# Patient Record
Sex: FEMALE | Race: OTHER | HISPANIC OR LATINO
[De-identification: names, ages, dates, MRNs, and addresses within clinical notes are randomized per-mention and may not be internally consistent; named-entity substitution may affect disease eponyms.]

---

## 2017-01-23 ENCOUNTER — APPOINTMENT (OUTPATIENT)
Dept: UROGYNECOLOGY | Facility: CLINIC | Age: 62
End: 2017-01-23

## 2017-01-23 VITALS
WEIGHT: 205 LBS | SYSTOLIC BLOOD PRESSURE: 104 MMHG | HEIGHT: 66 IN | DIASTOLIC BLOOD PRESSURE: 69 MMHG | BODY MASS INDEX: 32.95 KG/M2

## 2017-01-23 DIAGNOSIS — R39.15 URGENCY OF URINATION: ICD-10-CM

## 2017-01-23 DIAGNOSIS — Z98.890 OTHER SPECIFIED POSTPROCEDURAL STATES: ICD-10-CM

## 2017-01-23 LAB
BILIRUB UR QL STRIP: NEGATIVE
CLARITY UR: CLEAR
COLLECTION METHOD: NORMAL
GLUCOSE UR-MCNC: NEGATIVE
HCG UR QL: 0.2 EU/DL
HGB UR QL STRIP.AUTO: NORMAL
KETONES UR-MCNC: NORMAL
LEUKOCYTE ESTERASE UR QL STRIP: NEGATIVE
NITRITE UR QL STRIP: NEGATIVE
PH UR STRIP: 5.5
PROT UR STRIP-MCNC: NEGATIVE
SP GR UR STRIP: 1.01

## 2018-06-15 ENCOUNTER — EMERGENCY (EMERGENCY)
Facility: HOSPITAL | Age: 63
LOS: 1 days | Discharge: HOME | End: 2018-06-15
Attending: EMERGENCY MEDICINE
Payer: COMMERCIAL

## 2018-06-15 VITALS — HEIGHT: 63 IN | WEIGHT: 199.96 LBS

## 2018-06-15 VITALS
DIASTOLIC BLOOD PRESSURE: 72 MMHG | TEMPERATURE: 98 F | HEART RATE: 64 BPM | RESPIRATION RATE: 18 BRPM | OXYGEN SATURATION: 98 % | SYSTOLIC BLOOD PRESSURE: 134 MMHG

## 2018-06-15 DIAGNOSIS — K82.9 DISEASE OF GALLBLADDER, UNSPECIFIED: Chronic | ICD-10-CM

## 2018-06-15 DIAGNOSIS — Z90.710 ACQUIRED ABSENCE OF BOTH CERVIX AND UTERUS: Chronic | ICD-10-CM

## 2018-06-15 PROCEDURE — 73562 X-RAY EXAM OF KNEE 3: CPT | Mod: 26,RT

## 2018-06-15 PROCEDURE — T1013: CPT

## 2018-06-15 PROCEDURE — 99283 EMERGENCY DEPT VISIT LOW MDM: CPT

## 2018-06-15 PROCEDURE — 73562 X-RAY EXAM OF KNEE 3: CPT

## 2018-06-15 RX ORDER — ACETAMINOPHEN 500 MG
975 TABLET ORAL ONCE
Qty: 0 | Refills: 0 | Status: COMPLETED | OUTPATIENT
Start: 2018-06-15 | End: 2018-06-15

## 2018-06-15 RX ADMIN — Medication 975 MILLIGRAM(S): at 08:44

## 2018-06-15 NOTE — ED STATDOCS - PLAN OF CARE
ace wrap, Apply ice to affected area for 15-20 minutes every few hours for the next few days.  Use as much or as frequently as desired. Tylenol extra strength 2 tablets every 4 hours or Ibuprofen 600mg (3 tablets) every 6 hours as needed for aches, pains. Return immediately to the ER for re-evaluation if your symptoms recur or worsening. Otherwise, follow-up with PMD or orthopedics in 1-2 weeks for reassessment: call today to arrange an appointment

## 2018-06-15 NOTE — ED STATDOCS - CARE PLAN
Principal Discharge DX:	Acute pain of right knee  Assessment and plan of treatment:	ace wrap, Apply ice to affected area for 15-20 minutes every few hours for the next few days.  Use as much or as frequently as desired. Tylenol extra strength 2 tablets every 4 hours or Ibuprofen 600mg (3 tablets) every 6 hours as needed for aches, pains. Return immediately to the ER for re-evaluation if your symptoms recur or worsening. Otherwise, follow-up with PMD or orthopedics in 1-2 weeks for reassessment: call today to arrange an appointment

## 2018-06-15 NOTE — ED STATDOCS - MUSCULOSKELETAL, MLM
no deformity, decrease active ROM 0-60 degress, no effusion/ ballotment, no localized bony tendneress, no gross ligamentous deformity

## 2018-06-15 NOTE — ED ADULT NURSE NOTE - OBJECTIVE STATEMENT
Pt was on her way to work this morning and felt something "crack" in her right knee. No swelling or deformity.

## 2018-06-15 NOTE — ED STATDOCS - OBJECTIVE STATEMENT
sudden onset of pain in right knee getting up from chair this morning; felt pop/ pain.  no trauma or injruy. no prior knee problems.  no hip or ankle pain.  hasn't taken any meds.  PMD:  Mark Anthony

## 2018-06-25 ENCOUNTER — APPOINTMENT (OUTPATIENT)
Dept: ORTHOPEDIC SURGERY | Facility: CLINIC | Age: 63
End: 2018-06-25
Payer: COMMERCIAL

## 2018-06-25 VITALS
DIASTOLIC BLOOD PRESSURE: 61 MMHG | HEIGHT: 65 IN | SYSTOLIC BLOOD PRESSURE: 132 MMHG | HEART RATE: 81 BPM | TEMPERATURE: 98 F | WEIGHT: 200 LBS | BODY MASS INDEX: 33.32 KG/M2

## 2018-06-25 PROCEDURE — 20610 DRAIN/INJ JOINT/BURSA W/O US: CPT | Mod: RT

## 2018-06-25 PROCEDURE — 99204 OFFICE O/P NEW MOD 45 MIN: CPT | Mod: 25

## 2018-07-09 ENCOUNTER — FORM ENCOUNTER (OUTPATIENT)
Age: 63
End: 2018-07-09

## 2018-07-10 ENCOUNTER — OUTPATIENT (OUTPATIENT)
Dept: OUTPATIENT SERVICES | Facility: HOSPITAL | Age: 63
LOS: 1 days | End: 2018-07-10
Payer: COMMERCIAL

## 2018-07-10 ENCOUNTER — APPOINTMENT (OUTPATIENT)
Dept: MRI IMAGING | Facility: CLINIC | Age: 63
End: 2018-07-10
Payer: COMMERCIAL

## 2018-07-10 DIAGNOSIS — K82.9 DISEASE OF GALLBLADDER, UNSPECIFIED: Chronic | ICD-10-CM

## 2018-07-10 DIAGNOSIS — M17.11 UNILATERAL PRIMARY OSTEOARTHRITIS, RIGHT KNEE: ICD-10-CM

## 2018-07-10 DIAGNOSIS — Z90.710 ACQUIRED ABSENCE OF BOTH CERVIX AND UTERUS: Chronic | ICD-10-CM

## 2018-07-10 PROCEDURE — 73721 MRI JNT OF LWR EXTRE W/O DYE: CPT | Mod: 26,RT

## 2018-07-10 PROCEDURE — 73721 MRI JNT OF LWR EXTRE W/O DYE: CPT

## 2018-07-13 ENCOUNTER — APPOINTMENT (OUTPATIENT)
Dept: ORTHOPEDIC SURGERY | Facility: CLINIC | Age: 63
End: 2018-07-13
Payer: COMMERCIAL

## 2018-07-13 DIAGNOSIS — M17.11 UNILATERAL PRIMARY OSTEOARTHRITIS, RIGHT KNEE: ICD-10-CM

## 2018-07-13 PROCEDURE — 99214 OFFICE O/P EST MOD 30 MIN: CPT

## 2018-07-13 RX ORDER — DICLOFENAC SODIUM 75 MG/1
75 TABLET, DELAYED RELEASE ORAL
Qty: 28 | Refills: 2 | Status: ACTIVE | COMMUNITY
Start: 2018-07-13 | End: 1900-01-01

## 2019-02-22 ENCOUNTER — TRANSCRIPTION ENCOUNTER (OUTPATIENT)
Age: 64
End: 2019-02-22

## 2023-07-04 ENCOUNTER — EMERGENCY (EMERGENCY)
Facility: HOSPITAL | Age: 68
LOS: 1 days | Discharge: HOME | End: 2023-07-04
Attending: STUDENT IN AN ORGANIZED HEALTH CARE EDUCATION/TRAINING PROGRAM
Payer: MEDICARE

## 2023-07-04 VITALS
DIASTOLIC BLOOD PRESSURE: 74 MMHG | WEIGHT: 179.9 LBS | SYSTOLIC BLOOD PRESSURE: 136 MMHG | OXYGEN SATURATION: 99 % | TEMPERATURE: 98 F | HEART RATE: 103 BPM | RESPIRATION RATE: 18 BRPM | HEIGHT: 65 IN

## 2023-07-04 VITALS
HEART RATE: 96 BPM | RESPIRATION RATE: 18 BRPM | SYSTOLIC BLOOD PRESSURE: 133 MMHG | TEMPERATURE: 98 F | OXYGEN SATURATION: 98 % | DIASTOLIC BLOOD PRESSURE: 68 MMHG

## 2023-07-04 DIAGNOSIS — Z90.710 ACQUIRED ABSENCE OF BOTH CERVIX AND UTERUS: Chronic | ICD-10-CM

## 2023-07-04 DIAGNOSIS — K82.9 DISEASE OF GALLBLADDER, UNSPECIFIED: Chronic | ICD-10-CM

## 2023-07-04 PROCEDURE — 99284 EMERGENCY DEPT VISIT MOD MDM: CPT | Mod: GC

## 2023-07-04 PROCEDURE — 99283 EMERGENCY DEPT VISIT LOW MDM: CPT | Mod: 25

## 2023-07-04 PROCEDURE — 93010 ELECTROCARDIOGRAM REPORT: CPT

## 2023-07-04 PROCEDURE — 93005 ELECTROCARDIOGRAM TRACING: CPT

## 2023-07-04 PROCEDURE — 82962 GLUCOSE BLOOD TEST: CPT

## 2023-07-04 NOTE — ED PROVIDER NOTE - NSFOLLOWUPINSTRUCTIONS_ED_ALL_ED_FT
You were seen in the emergency room for tremors after taking Lyrica. EKG and your bloodsugar were unremarkable. Please follow up with your outpatient providers as needed. You were seen in the emergency room for tremors after taking Lyrica. EKG and your bloodsugar were unremarkable. Please only take medications that are prescribed to you. Please follow up with your outpatient providers as needed. You were seen in the emergency room for tremors after taking Lyrica. EKG and your bloodsugar were unremarkable. Please follow up with your outpatient providers as needed.    Please only take medications that are prescribed to you!!!!    Follow up with neurologist as needed for persistent tremor.

## 2023-07-04 NOTE — ED PROVIDER NOTE - PHYSICAL EXAMINATION
General: Awake, alert, well appearing  HEENT: EOMI. No scleral icterus or conjunctival injection. Moist mucous membranes.   Neck:. Soft and supple. Trachea midline  Cardiac: Extremities warm and well perfused. No LE edema.  Resp: No respiratory distress or accessory muscle use.   Abd: Soft, non-distended. No overlying skin changes  Skin: No rashes, abrasions, or lacerations.  Neuro: AO x 4. Mild tremor noted to tongue on protrusion. No tremor noted at rest. CN II-XII intact. 5/5 strength in all extremities. No dysmetria on finger to nose   Psych: Appropriate mood and affect

## 2023-07-04 NOTE — ED ADULT NURSE REASSESSMENT NOTE - NS ED NURSE REASSESS COMMENT FT1
received report from day RN, assumed care of pt at change of shift.  pt states she developed "shakiness" after taking 1 lyrica pill that was not rx'd to her.  pt states that she took if for leg pain.   pt states she slipped and fell in the bathroom 2-3 weeks ago.  no s/s acute distress noted.  pt awaiting to see MD.  family at bedside.

## 2023-07-04 NOTE — ED ADULT NURSE NOTE - OBJECTIVE STATEMENT
Pt with left knee pain r/t mechanical fall 4 days ago took Lyrica today that was not hers. Pt states that there is someone that lives in the home with her who also has knee pain, so she believed the Lyrica would help her. The pt then began to feel shaky and presented to the ED. Pt states "I feel like it is wearing off now." AAOx2, NAD.

## 2023-07-04 NOTE — ED PROVIDER NOTE - PATIENT PORTAL LINK FT
You can access the FollowMyHealth Patient Portal offered by [facility] by registering at the following website: [email or web address]. By joining [facility]’s FollowMyHealth portal, you will also be able to view your health information using other applications (apps) compatible with our system.

## 2023-07-04 NOTE — ED PROVIDER NOTE - ATTENDING CONTRIBUTION TO CARE
68y F w/ hx hypothyroid, HLD; presents for tremors. Pt says that she took a friend's Lyrica (400 mg) this afternoon to help with knee pain. Later woke up feeling shaky and anxious. Denies drug or alcohol use. Currently feels improved. No chest pain. On exam, pt in no distress with minimal tremor. Nonfocal neuro exam with no dysmetria or ataxia. Ambulatory with antalgic gait 2/2 left knee pain. Suspect medication side effect. Will check fingerstick and EKG; plan for discharge if no acute findings.

## 2023-07-04 NOTE — ED PROVIDER NOTE - CARE PROVIDER_API CALL
Clovis Zuleta  Neurology  [address] [place] [address], [facility] [address]  [place] [address]  Phone: [phone]  Fax: [phone]  Follow Up Time:

## 2023-07-04 NOTE — ED ADULT NURSE NOTE - NSFALLRISKINTERV_ED_ALL_ED

## 2023-07-04 NOTE — ED PROVIDER NOTE - PRINCIPAL DIAGNOSIS
Summer Lewis  Med Surg  [address] [facility] [address]  Phone: [phone]            July 13, 2022     Patient: Ghazal Brewster   YOB: 1972   Date of Visit: 7/12/2022       To Whom It May Concern: It is my medical opinion that Velvet Sparrow should remain out of work until the evening of the 14th (Thursday). Please excuse for Tues- Thurs    If you have any questions or concerns, please don't hesitate to call.     Sincerely,        Coco Quiroz MD
Tremor

## 2023-07-04 NOTE — ED ADULT TRIAGE NOTE - CHIEF COMPLAINT QUOTE
Took her friend's lyrica at 1400 to treat left knee pain.  Took a nap and then woke up with tremors.  She denies any additional symptoms.  Upper extremity tremors present.

## 2023-07-04 NOTE — ED PROVIDER NOTE - CLINICAL SUMMARY MEDICAL DECISION MAKING FREE TEXT BOX
68F presents with tremors following ingestion of 400 Lyrica today, now much improved without intervention. On exam pt with mild tremor otherwise unremarkable. Will obtain EKG, fingerstick. If unremarkable, likely 2/2 medication adverse effect, and will dc home

## 2023-07-04 NOTE — ED PROVIDER NOTE - OBJECTIVE STATEMENT
68F hx hypothyroidism, HLD presents for tremors beginning this afternoon. Pt states she was having left knee pain and took 400 Lyrica from a friend and had a nap. Upon waking up pt felt tremors in her arms and her tongue. She denies any coingestion, EtOH, or drug use. Otherwise no N/V, HA, vision changes. Her symptoms are much improved from earlier this afternoon without intervention

## 2023-08-03 ENCOUNTER — APPOINTMENT (OUTPATIENT)
Dept: ORTHOPEDIC SURGERY | Facility: CLINIC | Age: 68
End: 2023-08-03
Payer: MEDICARE

## 2023-08-03 VITALS
SYSTOLIC BLOOD PRESSURE: 90 MMHG | DIASTOLIC BLOOD PRESSURE: 65 MMHG | HEART RATE: 96 BPM | BODY MASS INDEX: 33.32 KG/M2 | WEIGHT: 200 LBS | HEIGHT: 65 IN

## 2023-08-03 DIAGNOSIS — M17.0 BILATERAL PRIMARY OSTEOARTHRITIS OF KNEE: ICD-10-CM

## 2023-08-03 PROCEDURE — 73564 X-RAY EXAM KNEE 4 OR MORE: CPT | Mod: 50

## 2023-08-03 PROCEDURE — 99204 OFFICE O/P NEW MOD 45 MIN: CPT | Mod: 25

## 2023-08-03 PROCEDURE — 20610 DRAIN/INJ JOINT/BURSA W/O US: CPT | Mod: LT

## 2023-08-03 RX ORDER — MELOXICAM 15 MG/1
15 TABLET ORAL
Qty: 30 | Refills: 1 | Status: ACTIVE | COMMUNITY
Start: 2023-08-03 | End: 1900-01-01

## 2023-08-03 NOTE — ADDENDUM
[FreeTextEntry1] : This note was written by Deloris Bills, acting as the  for Dr. Florez. This note accurately reflects the work and decisions made by Dr. Florez.

## 2023-08-03 NOTE — DISCUSSION/SUMMARY
[Medication Risks Reviewed] : Medication risks reviewed [PRN] : PRN [Surgical risks reviewed] : Surgical risks reviewed [de-identified] : patient is a 60-year-old female with severe bilateral knee osteoarthritis presenting today for initial evaluation.  At this time her left knee is very symptomatic however she states her right knee is only mildly bothering her.  We discussed conservative treatment for her bilateral knees and she opted to have a cortisone injection in the left knee which she tolerated well.  She would like to defer any injections in the right knee at this time.  We discussed low impact activity and exercises.  She will continue take Tylenol and NSAIDs as needed for the pain.  I given her prescription for physical therapy.  I will see her back on an as-needed basis for her bilateral knees possible viscosupplementation versus cortisone injections versus operative intervention in the future.  All questions were asked and answered with

## 2023-08-03 NOTE — HISTORY OF PRESENT ILLNESS
[Pain Location] : pain [] : right & left knee [Worsening] : worsening [___ mths] : [unfilled] month(s) ago [Standing] : standing [Constant] : ~He/She~ states the symptoms seem to be constant [Bending] : worsened by bending [Sitting] : worsened by sitting [Walking] : worsened by walking [NSAIDs] : relieved by nonsteroidal anti-inflammatory drugs [de-identified] : 68 year old female patient presents today for an initial consultation for bilateral knee pain. The patient states the left knee pain is worse than the right knee pain. The pain has been ongoing for over 2 months. She ambulates with the use of crutches.  She no longer goes up and down the stairs due to the pain. She takes Diclofenac pills for the pain.  Feels like the knees are going to buckle on her.  States that the pain is mainly over the lateral aspect of the knee.  Denies any hip pain or neurovascular compromise.  Is taking anti-inflammatories with some relief of the pain.  Has not had any recent injections or been to physical therapy [de-identified] : going up and down the stairs

## 2023-08-03 NOTE — PROCEDURE
[Injection] : Injection [Left] : of the left [Knee Joint] : knee joint [Patient] : patient [Alcohol] : Alcohol [Ethyl Chloride Spray] : ethyl chloride spray was used as a topical anesthetic [Lateral] : lateral [22] : a 22-gauge [Bandage Applied] : a bandage [Tolerated Well] : The patient tolerated the procedure well [None] : none [No Strenuous Activity___day(s)] : avoid strenuous activity for [unfilled] day(s) [___ Month(s)] : in [unfilled] month(s) [de-identified] : I injected the left knee.  I discussed at length with the patient the planned steroid and lidocaine injection. The risks, benefits, convalescence and alternatives were reviewed. The possible side effects discussed included but were not limited to: pain, swelling, heat, bleeding, and redness. Symptoms are generally mild but if they are extensive then contact the office. Giving pain relievers by mouth such as NSAIDs or Tylenol can generally treat the reactions to steroid and lidocaine. Rare cases of infection have been noted. Rash, hives and itching may occur post injection. If you have muscle pain or cramps, flushing and or swelling of the face, rapid heart beat, nausea, dizziness, fever, chills, headache, difficulty breathing, swelling in the arms or legs, or have a prickly feeling of your skin, contact a health care provider immediately. Following this discussion, the knee was prepped with Alcohol and under sterile condition the 80 mg Depo-Medrol and 6 cc Lidocaine injection was performed with a 20 gauge needle through a superolateral injection site. The needle was introduced into the joint, aspiration was performed to ensure intra-articular placement and the medication was injected. Upon withdrawal of the needle the site was cleaned with alcohol and a band aid applied. The patient tolerated the injection well and there were no adverse effects. Post injection instructions included no strenuous activity for 24 hours, cryotherapy and if there are any adverse effects to contact the office.

## 2023-08-03 NOTE — PHYSICAL EXAM
[Crutches] : ambulates with crutches [de-identified] : GENERAL APPEARANCE: Well-nourished and hydrated, pleasant, alert, and oriented x 3. Appears their stated age.  HEENT: Normocephalic, extraocular eye motion intact. Nasal septum midline. Oral cavity clear. External auditory canal clear.  RESPIRATORY: Breath sounds clear and audible in all lobes. No wheezing, No accessory muscle use. CARDIOVASCULAR: No apparent abnormalities. No lower leg edema. No varicosities. Pedal pulses are palpable. NEUROLOGIC: Sensation is normal, no muscle weakness in the upper or lower extremities. DERMATOLOGIC: No apparent skin lesions, moist, warm, no rash. SPINE: Cervical spine appears normal and moves freely; thoracic spine appears normal and moves freely; lumbosacral spine appears normal and moves freely, normal, nontender. MUSCULOSKELETAL: Hands, wrists, and elbows are normal and move freely, shoulders are normal and move freely.  PSYCHIATRIC: Oriented to person, place, and time, insight and judgement were intact and the affect was normal. [de-identified] : Right knee exam shows mild effusion, ROM is 0-120 degrees, no instability, negative Braxton, lateral joint line tenderness. Left knee exam shows mild effusion, ROM is 0-100 degrees, no instability, negative Braxton, lateral joint line tenderness. 5/5 motor strength in bilateral lower extremities. Sensory: Intact in bilateral lower extremities. DTRs: Biceps, brachioradialis, triceps, patellar, ankle and plantar 2+ and symmetric bilaterally. Pulses: dorsalis pedis, posterior tibial, femoral, popliteal, and radial 2+ and symmetric bilaterally. [de-identified] : 4 views of the bilateral knees obtained the office today show no acute fracture or dislocation.  There is severe lateral joint space narrowing most pronounced on the Xie view tricompartmental degenerative changes bilaterally consistent with Kellgren-Forrest grade 3 4 changes

## 2023-08-03 NOTE — REASON FOR VISIT
[[employer] ] : provided by [employer]   [Initial Consultation] : an initial consultation for [Other: ____] : [unfilled] [Interpreters_IDNumber] : [id number] [Interpreters_FullName] : Barb [TWNoteComboBox1] : American

## 2024-05-07 ENCOUNTER — APPOINTMENT (OUTPATIENT)
Dept: VASCULAR SURGERY | Facility: CLINIC | Age: 69
End: 2024-05-07
Payer: MEDICARE

## 2024-05-07 VITALS
BODY MASS INDEX: 37.76 KG/M2 | DIASTOLIC BLOOD PRESSURE: 75 MMHG | RESPIRATION RATE: 16 BRPM | HEIGHT: 61 IN | TEMPERATURE: 98.9 F | SYSTOLIC BLOOD PRESSURE: 124 MMHG | WEIGHT: 200 LBS | HEART RATE: 68 BPM | OXYGEN SATURATION: 100 %

## 2024-05-07 DIAGNOSIS — G47.62 SLEEP RELATED LEG CRAMPS: ICD-10-CM

## 2024-05-07 PROCEDURE — 99202 OFFICE O/P NEW SF 15 MIN: CPT

## 2024-05-07 NOTE — PHYSICAL EXAM
[2+] : left 2+ [de-identified] : Appears well no acute distress. [de-identified] : Bilateral legs are large but no significant edema.  Left medial ankle there is a small thrombosed varicosity nontender no erythema.

## 2024-05-07 NOTE — ASSESSMENT
[FreeTextEntry1] : 69-year-old female with evening leg cramps.  She has easily palpable pulses no evidence of arterial occlusive disease no evidence of significant venous disease.  Do not believe that her symptoms are related to circulatory issues.  In general it usually hypokalemia and dehydration can lead to evening leg cramps suggest slight increase in her water intake and perhaps an electrolyte drink.  She can follow-up as needed.

## 2024-05-07 NOTE — HISTORY OF PRESENT ILLNESS
[FreeTextEntry1] : I pleasant 69-year-old female.  Presents because of cramping in her legs at night.  Denies claudication symptoms.  Not a current smoker.  She is here to check her circulation as the cause.  She denies any significant swelling of her legs or even fatigue at the end of the day but just at night has cramps in the muscles  Over legs.

## 2024-09-11 ENCOUNTER — APPOINTMENT (OUTPATIENT)
Dept: DERMATOLOGY | Facility: CLINIC | Age: 69
End: 2024-09-11
Payer: MEDICARE

## 2024-09-11 ENCOUNTER — RESULT REVIEW (OUTPATIENT)
Age: 69
End: 2024-09-11

## 2024-09-11 PROCEDURE — 99202 OFFICE O/P NEW SF 15 MIN: CPT | Mod: 25

## 2024-09-11 PROCEDURE — 11102 TANGNTL BX SKIN SINGLE LES: CPT

## 2024-09-11 PROCEDURE — 11103 TANGNTL BX SKIN EA SEP/ADDL: CPT | Mod: 59

## 2025-04-22 NOTE — ED ADULT NURSE NOTE - NS ED NURSE LEVEL OF CONSCIOUSNESS AFFECT
PT Re-Evaluation     Today's date: 2025  Patient name: Sandy Mcgovern  : 1953  MRN: [id number]  Referring provider: Bekah Isaacs DO  Dx:   Encounter Diagnosis     ICD-10-CM    1. Arthritis of right ankle  M19.071       2. Posterior tibial tendon dysfunction (PTTD) of left lower extremity  M76.822       3. Pes planus of left foot  M21.42                          Assessment  Impairments: lacks appropriate home exercise program    Assessment details: Patient presents with arthritis of right ankle and pes planus of bilateral feet. Sandy has remained very consistent with her home routine and is very please with how everything has changed and improved. Pt educated on importance of continuing HEP, progressions of exercises and to contact the facility if there are any questions or concerns in the future. Pt will be discharged from PT at this time.      Goals  Impairment Goals:  1. Decrease right ankle pain to 0/10 with activities in 8 weeks  2. Increase right ankle inversion ROM by 5* in 8 weeks  3. Increase b/l lower extremity strength by ½ grade in 8 weeks  4. Pt will have improved ankle strength to 4/5 throughout in 3-4 weeks. new    Functional Goals:  1. Patient demonstrates independence with HEP upon discharge  2. Patient is able to ambulate household distances without right ankle pain in 8-12 weeks  3. Pt will be able to walk for 1/2 mile without ankle pain and without an assistive device in 3-4 weeks. New  4. Pt will be able to stand for 20 minutes without ankle pain in 3-4 weeks. new    Plan  Patient would benefit from: PT eval and skilled physical therapy    Planned therapy interventions: home exercise program    Frequency: 1x week  Duration in weeks: 8  Plan of Care beginning date: 3/26/2025  Plan of Care expiration date: 2025  Treatment plan discussed with: patient        Subjective Evaluation    History of Present Illness  Mechanism of injury: Sandy has experienced good improvements  "in ankle mobility, ability to ambulate with more confidence and without a cane as compared to needed one when first starting. She has not fallen recently and she has felt more stable. She has become consistent with all TE and feels that her improvements have hit a plateau. She feels comfortable transitioning to an independent HEP at this time.     71 yo female presents with c/o right ankle arthritis impacting quality of life. Pt reports she has been wearing a \"horizon\" left ankle/foot brace for the last 6-7 years due to a collapsing arch. Pt notes no brace and orthotic has been prescribed for her right foot. Pt also c/o feeling unsteady or \"bouncy\" on her feet and would like to improve her physical fitness and balance.  Patient Goals  Patient goals for therapy: improved balance, increased motion and independence with ADLs/IADLs    Pain  Current pain ratin  At best pain ratin  At worst pain ratin (min afternoon)  Location: Right Ankle  Quality: dull ache and tight  Aggravating factors: standing, walking and stair climbing  Progression: improved        Objective     Active Range of Motion   Left Ankle/Foot   Dorsiflexion (ke): 20 degrees   Dorsiflexion (kf): 23 degrees   Inversion: 15 degrees   Eversion: 6 degrees     Right Ankle/Foot   Dorsiflexion (ke): 22 degrees   Dorsiflexion (kf): 25 degrees   Inversion: 20 degrees with pain  Eversion: 5 degrees     Strength/Myotome Testing     Left Hip   Planes of Motion   Flexion: 3+  Abduction: 3+  Adduction: 4  External rotation: 4+    Right Hip   Planes of Motion   Flexion: 4-  Abduction: 3-  Adduction: 4-  External rotation: 4    Left Knee   Flexion: 4-  Extension: 4+    Right Knee   Flexion: 4+ (left lower leg pain)  Extension: 4+    Left Ankle/Foot   Dorsiflexion: 4+  Plantar flexion: 3+  Inversion: 3+  Eversion: 3+  Great toe flexion: 4  Great toe extension: 4+    Right Ankle/Foot   Dorsiflexion: 4+  Plantar flexion: 3+  Inversion: 3+  Eversion: 3+  Great " "toe flexion: 4  Great toe extension: 4+    Additional Strength Details  SLR w/ ER: 4-/5 R, 3/5 L    Ambulation     Comments   Pt ambulates with a SPC and soft left ankle brace. Left LE is excessively externally rotated throughout gait and mild increase in right hip ER with ambulation. Right foot pronates during stance phase.           Precautions: none        Manuals 2/13 2/27 3/4 3/18 3/25 4/1 4/22     1/29   Bilateral Ankle/foot PROM/mobs RN KS RN RN RN RN RN     RN   Left Ankle/Foot                                                                       Neuro Re-Ed                       Ankle DF/PF                       Ankle Inv Iso                       TA SLR                       Bridges                       Ankle balance board 10x a/p, side 20x a/p, side 20x a/p, side 20x a/p, side 2x10 a/p, side 2x10 a/p, side 2x10 a/p, side     10x ap, side   Semi tandem stance - EC R ant 2x20''                 R ant 2x20\"   Wobble board standing 2x10 a/p, side 2x10 a/p, side 2x1 a/p, side 2x10 a/o, side 2x10 a/p, side 2x10 a/p, side 2x10 a/p side     2x10 ap, side   Standing tandem stance                       NBOS EO/EC 3x15'' w/ head turns (L/R, U/D) 3x15'' w/ head turns (L/R, U/D) 3x15'' w/ head turns (L/R, U/D) 3x15'' w/ head turns (L/R, U/D) 3x15'' w/ head turns (L/R, U/D) 3x15'' w/ head turns (L/R, U/D) 3x15'' w/ head turns (L/R, U/D)     3x15'' w/ head turns (L/R, U/D) on foam   Tandem walking 8 laps 10'     nv           8 laps 10'   Standing calf s' 10x10'' ea                 10x10''   NBOS on foam  3x30'' EC 3x30\" EC 3x30'' EC 3x30'' EC 3x30'' EC 3x30'' EC 3x30'' EC     3x30'' EC   MICHAELU david                    Marches on foam                    Toe taps                                                                                      Ther Ex                       Balance assessment                       Ankle IV PROM                       HR/TR 2x10 standing 2x10 standing 2x10 standing 2x10 standing 2x10 " Calm standing 2x10 standing 2x10 standing ea     2x10 standing   Seated DF/PF           2x10 Black TB 2x10 Black TB     2x10 Black TB   HEP                   Gastroc and Plantar fascia towel stretch   Bike (Cardiovascular Endurance)                                                                          Ther Activity                                                                       Gait Training                                                                       Modalities